# Patient Record
Sex: FEMALE | Race: WHITE | Employment: OTHER | URBAN - METROPOLITAN AREA
[De-identification: names, ages, dates, MRNs, and addresses within clinical notes are randomized per-mention and may not be internally consistent; named-entity substitution may affect disease eponyms.]

---

## 2017-07-05 ENCOUNTER — HOSPITAL ENCOUNTER (OUTPATIENT)
Dept: LAB | Age: 67
Discharge: HOME | End: 2017-07-05
Attending: INTERNAL MEDICINE
Payer: MEDICARE

## 2017-07-05 DIAGNOSIS — E78.2 MIXED HYPERLIPIDEMIA: ICD-10-CM

## 2017-07-05 LAB
ALBUMIN SERPL BCP-MCNC: 3.8 G/DL (ref 3.2–4.6)
ALBUMIN/GLOB SERPL: 0.9 {RATIO}
ALP SERPL-CCNC: 89 U/L (ref 50–136)
ALT SERPL-CCNC: 26 U/L (ref 12–65)
ANION GAP BLD CALC-SCNC: 9 MMOL/L
AST SERPL W P-5'-P-CCNC: 35 U/L (ref 15–37)
BILIRUB SERPL-MCNC: 0.6 MG/DL (ref 0.2–1.1)
BUN SERPL-MCNC: 10 MG/DL (ref 8–23)
CALCIUM SERPL-MCNC: 9.1 MG/DL (ref 8.3–10.4)
CHLORIDE SERPL-SCNC: 105 MMOL/L (ref 98–107)
CHOLEST SERPL-MCNC: 156 MG/DL
CO2 SERPL-SCNC: 25 MMOL/L (ref 21–32)
CREAT SERPL-MCNC: 0.7 MG/DL (ref 0.6–1)
GLOBULIN SER CALC-MCNC: 4.1 G/DL
GLUCOSE SERPL-MCNC: 103 MG/DL (ref 65–100)
HDLC SERPL-MCNC: 63 MG/DL (ref 40–60)
HDLC SERPL: 2.5 {RATIO}
LDLC SERPL CALC-MCNC: 56.6 MG/DL
LIPID PROFILE,FLP: ABNORMAL
POTASSIUM SERPL-SCNC: 4.3 MMOL/L (ref 3.5–5.1)
PROT SERPL-MCNC: 7.9 G/DL (ref 6.3–8.2)
SODIUM SERPL-SCNC: 139 MMOL/L (ref 136–145)
TRIGL SERPL-MCNC: 182 MG/DL (ref 35–150)
VLDLC SERPL CALC-MCNC: 36.4 MG/DL (ref 6–23)

## 2017-07-05 PROCEDURE — 80061 LIPID PANEL: CPT | Performed by: INTERNAL MEDICINE

## 2017-07-05 PROCEDURE — 36415 COLL VENOUS BLD VENIPUNCTURE: CPT | Performed by: INTERNAL MEDICINE

## 2017-07-05 PROCEDURE — 80053 COMPREHEN METABOLIC PANEL: CPT | Performed by: INTERNAL MEDICINE

## 2022-01-08 ENCOUNTER — APPOINTMENT (OUTPATIENT)
Dept: GENERAL RADIOLOGY | Age: 72
End: 2022-01-08
Attending: EMERGENCY MEDICINE
Payer: MEDICARE

## 2022-01-08 ENCOUNTER — HOSPITAL ENCOUNTER (EMERGENCY)
Age: 72
Discharge: HOME | End: 2022-01-08
Attending: EMERGENCY MEDICINE
Payer: MEDICARE

## 2022-01-08 VITALS
BODY MASS INDEX: 31.05 KG/M2 | HEIGHT: 60 IN | DIASTOLIC BLOOD PRESSURE: 77 MMHG | OXYGEN SATURATION: 97 % | HEART RATE: 83 BPM | TEMPERATURE: 98 F | SYSTOLIC BLOOD PRESSURE: 156 MMHG | RESPIRATION RATE: 16 BRPM

## 2022-01-08 DIAGNOSIS — M51.36 LUMBAR DEGENERATIVE DISC DISEASE: ICD-10-CM

## 2022-01-08 DIAGNOSIS — M54.9 ACUTE RIGHT-SIDED BACK PAIN, UNSPECIFIED BACK LOCATION: Primary | ICD-10-CM

## 2022-01-08 LAB
BACTERIA URNS QL MICRO: 0 /HPF
CASTS URNS QL MICRO: NORMAL /LPF
EPI CELLS #/AREA URNS HPF: NORMAL /HPF
RBC #/AREA URNS HPF: NORMAL /HPF
WBC URNS QL MICRO: NORMAL /HPF

## 2022-01-08 PROCEDURE — 99283 EMERGENCY DEPT VISIT LOW MDM: CPT

## 2022-01-08 PROCEDURE — 81003 URINALYSIS AUTO W/O SCOPE: CPT

## 2022-01-08 PROCEDURE — 72100 X-RAY EXAM L-S SPINE 2/3 VWS: CPT

## 2022-01-08 PROCEDURE — 81015 MICROSCOPIC EXAM OF URINE: CPT

## 2022-01-08 RX ORDER — NAPROXEN SODIUM 550 MG/1
550 TABLET ORAL 2 TIMES DAILY WITH MEALS
Qty: 20 TABLET | Refills: 0 | Status: SHIPPED | OUTPATIENT
Start: 2022-01-08

## 2022-01-08 RX ORDER — METHOCARBAMOL 500 MG/1
500 TABLET, FILM COATED ORAL
Qty: 20 TABLET | Refills: 0 | Status: SHIPPED | OUTPATIENT
Start: 2022-01-08

## 2022-01-08 NOTE — ED PROVIDER NOTES
Patient presents to the ER with family with concerns about back pain. States symptoms started yesterday. States pain in her right lower back. States pain is made worse with movement and certain positions. States symptoms did intensify today. Did improve with taking Aleve. Denies any nausea vomiting. Denies any trauma. Denies any pain radiating down the legs. Denies incontinence of bowel bladder    The history is provided by the patient. Back Pain   This is a new problem. The current episode started yesterday. The problem has not changed since onset. The pain is associated with lifting and twisting. The pain is present in the right side and lower back. The quality of the pain is described as aching. The pain is at a severity of 4/10. The pain is moderate. Pertinent negatives include no chest pain, no fever, no numbness, no abdominal swelling, no bowel incontinence, no bladder incontinence, no paresthesias and no paresis. She has tried NSAIDs for the symptoms. The treatment provided mild relief. Past Medical History:   Diagnosis Date    Acute MI ([address] 4/2013    Antiplatelet or antithrombotic long-term use 6/4/2013    Effient for BMS 4/24/13 discontinued prior to admission for CABG    Benign hypertension 6/7/2016    CAD (coronary artery disease)     Cerebral infarction ([address] 6/14/2013    TIA: 1. Right facial droop (6/13/13): Resolved. Occurred 9 days post CABG     Chronic kidney disease     kidney stones    Chronic pain     diabetic neuropathy    Chronic systolic heart failure ([address] 6/7/2016    1. Echo (5/8/13): Moderately reduced LV function. EF 45%. Mild LVH    Coronary atherosclerosis of native coronary artery 4/24/2013 6/4/13 (Dr. Roderick Glaser) Coronary artery bypass grafting x4 grafts consisting  of  1. Left internal mammary artery to left anterior descending.   2. Reverse saphenous vein graft to diagonal.  3. Reverse saphenous vein graft to obtuse marginal.  4. Reverse saphenous vein graft to the posterior descending which came  off of the right coronary artery. 1. Inferior STEMI 4/24/13  A. LHC: 3 vessel CAD. EF 45% with inferior hypokinesis. B. PCI of LCx with 2.5 x 12 MiniVision.  2. CABG (6/4/13): LIMA to LAD, SVG to diagonal, SVG to PDA and SVG to OM     [facility]     DM (diabetes mellitus) type I uncontrolled, periph vascular disorder ([facility] 6/4/2013    A1c 7.0 \"Diet Controlled\" on no meds PTA     History of kidney stones     Hypoxemia 6/4/2013    Mild left ventricular systolic dysfunction 2/26/4224    Without clinical congestive heart failure      Mixed dyslipidemia 6/4/2013    Morbid obesity ([facility]     Obesity (BMI 30.0-34.9)     33.3    ST elevation myocardial infarction (STEMI) of inferior wall (HCC) 4/24/2013    Thrombocytopenia due to blood loss 6/4/2013    Transient ischemic attack, TIA, Unspecified  6/27/2016    Type II diabetes mellitus with complication adult onset, uncontrolled ([address] 6/27/2016       Past Surgical History:   Procedure Laterality Date    HX CORONARY ARTERY BYPASS GRAFT  6/4/13    x 4 - Dr. Jasson Juarez HX PTCA  4/24/13    HX UROLOGICAL  x 2    percutaneous nephrolithotomy    SC CARDIAC SURG PROCEDURE UNLIST           Family History:   Problem Relation Age of Onset    Heart Disease Father        Social History     Socioeconomic History    Marital status:      Spouse name: Not on file    Number of children: Not on file    Years of education: Not on file    Highest education level: Not on file   Occupational History     Employer: RETIRED     Comment: [employer]   Tobacco Use    Smoking status: Never Smoker    Smokeless tobacco: Never Used   Substance and Sexual Activity    Alcohol use: Yes     Comment: social    Drug use: No    Sexual activity: Not on file   Other Topics Concern    Not on file   Social History Narrative    Not on file     Social Determinants of Health     Financial Resource Strain:     Difficulty of Paying Living Expenses: Not on file   Food Insecurity:     Worried About Running Out of Food in the Last Year: Not on file    Gillian of Food in the Last Year: Not on file   Transportation Needs:     Lack of Transportation (Medical): Not on file    Lack of Transportation (Non-Medical): Not on file   Physical Activity:     Days of Exercise per Week: Not on file    Minutes of Exercise per Session: Not on file   Stress:     Feeling of Stress : Not on file   Social Connections:     Frequency of Communication with Friends and Family: Not on file    Frequency of Social Gatherings with Friends and Family: Not on file    Attends Congregation Services: Not on file    Active Member of [address] [place] [address] [employer] or Organizations: Not on file    Attends Club or Organization Meetings: Not on file    Marital Status: Not on file   Intimate Partner Violence:     Fear of Current or Ex-Partner: Not on file    Emotionally Abused: Not on file    Physically Abused: Not on file    Sexually Abused: Not on file   Housing Stability:     Unable to Pay for Housing in the Last Year: Not on file    Number of [place] in the Last Year: Not on file    Unstable Housing in the Last Year: Not on file         ALLERGIES: Levaquin in d5w [levofloxacin in d5w] and Other medication    Review of Systems   Constitutional: Negative for fever. HENT: Negative for congestion, dental problem and trouble swallowing. Eyes: Negative for photophobia and redness. Respiratory: Negative for chest tightness, shortness of breath and stridor. Cardiovascular: Negative for chest pain. Gastrointestinal: Negative for bowel incontinence, nausea and vomiting. Endocrine: Negative for polyuria. Genitourinary: Negative for bladder incontinence, hematuria, vaginal bleeding and vaginal pain. Musculoskeletal: Positive for back pain. Negative for myalgias. Skin: Negative for color change. Neurological: Negative for seizures, numbness and paresthesias.    Hematological: Negative for adenopathy. Does not bruise/bleed easily. Psychiatric/Behavioral: Negative for behavioral problems and confusion. All other systems reviewed and are negative. Vitals:    01/08/22 1542   BP: (!) 156/77   Pulse: 83   Resp: 16   Temp: 98 °F (36.7 °C)   SpO2: 97%   Height: 5' (1.524 m)            Physical Exam  Vitals and nursing note reviewed. Constitutional:       General: She is not in acute distress. Appearance: Normal appearance. She is not ill-appearing. HENT:      Head: Normocephalic and atraumatic. Right Ear: External ear normal.      Left Ear: External ear normal.      Nose: Nose normal. No congestion or rhinorrhea. Eyes:      General:         Right eye: No discharge. Left eye: No discharge. Extraocular Movements: Extraocular movements intact. Pupils: Pupils are equal, round, and reactive to light. Cardiovascular:      Rate and Rhythm: Normal rate and regular rhythm. Pulses: Normal pulses. Heart sounds: Normal heart sounds. No murmur heard. No friction rub. Pulmonary:      Effort: Pulmonary effort is normal. No respiratory distress. Breath sounds: Normal breath sounds. No stridor. No wheezing or rhonchi. Abdominal:      General: Abdomen is flat. There is no distension. Palpations: Abdomen is soft. There is no mass. Tenderness: There is no abdominal tenderness. Hernia: No hernia is present. Musculoskeletal:         General: No swelling, tenderness, deformity or signs of injury. Normal range of motion. Cervical back: Normal range of motion and neck supple. No rigidity or tenderness. Skin:     General: Skin is warm. Capillary Refill: Capillary refill takes less than 2 seconds. Coloration: Skin is not jaundiced or pale. Findings: No bruising. Neurological:      General: No focal deficit present. Mental Status: She is alert and oriented to person, place, and time.    Psychiatric:         Mood and Affect: Mood normal.         Behavior: Behavior normal.          MDM  Number of Diagnoses or Management Options  Diagnosis management comments: Fairly well-appearing here without any reported chest pain, no abdominal tenderness on exam, no reported nausea    Will obtain urinalysis and x-ray lumbar spine    5:14 PM  X-ray of lumbar spine does show some degenerative changes. Urinalysis here is negative for blood or infection      Remains well-appearing here. Plan to discharge home. Will place on NSAIDs as well as muscle relaxers. Encourage close follow-up with PCP       Amount and/or Complexity of Data Reviewed  Clinical lab tests: ordered and reviewed  Tests in the radiology section of CPT®: ordered and reviewed    Risk of Complications, Morbidity, and/or Mortality  Presenting problems: moderate  Diagnostic procedures: low  Management options: moderate    Patient Progress  Patient progress: stable         Procedures                   Results Include:    Recent Results (from the past 24 hour(s))   URINE MICROSCOPIC    Collection Time: 01/08/22  4:37 PM   Result Value Ref Range    WBC 0-3 0 /hpf    RBC 0-3 0 /hpf    Epithelial cells 0-3 0 /hpf    Bacteria 0 0 /hpf    Casts 0-3 0 /lpf     Voice dictation software was used during the making of this note. This software is not perfect and grammatical and other typographical errors may be present. This note has been proofread, but may still contain errors. Dre Tillman MD; 1/8/2022 @5:15 PM   ===================================================================    I wore appropriate PPE throughout this patient's ED visit.  Dre Tillman MD, 5:15 PM

## 2022-01-08 NOTE — ED TRIAGE NOTES
Pt reports mid/right sided back pain that started yesterday. Pt denies injury or trauma.     Jose Eller RN

## 2022-01-08 NOTE — DISCHARGE INSTRUCTIONS
Take medications as prescribed  Expect to have some stiffness and soreness for the next couple days  Follow-up with your primary care physician  Return to the ER for any new, worsening or life-threatening symptoms

## 2022-01-08 NOTE — ED NOTES
I have reviewed discharge instructions with the patient. The patient verbalized understanding. Patient left ED via Discharge Method: ambulatory to Home with . Opportunity for questions and clarification provided. Patient given 2 scripts. To continue your aftercare when you leave the hospital, you may receive an automated call from our care team to check in on how you are doing. This is a free service and part of our promise to provide the best care and service to meet your aftercare needs.  If you have questions, or wish to unsubscribe from this service please call [phone]. Thank you for Choosing our [facility] Emergency Department.

## 2022-08-02 ENCOUNTER — HOSPITAL ENCOUNTER (EMERGENCY)
Dept: GENERAL RADIOLOGY | Age: 72
Discharge: HOME | End: 2022-08-05
Payer: MEDICARE

## 2022-08-02 ENCOUNTER — HOSPITAL ENCOUNTER (EMERGENCY)
Age: 72
Discharge: HOME | End: 2022-08-02
Attending: EMERGENCY MEDICINE
Payer: MEDICARE

## 2022-08-02 VITALS
HEART RATE: 87 BPM | HEIGHT: 60 IN | OXYGEN SATURATION: 95 % | SYSTOLIC BLOOD PRESSURE: 147 MMHG | WEIGHT: 150 LBS | BODY MASS INDEX: 29.45 KG/M2 | RESPIRATION RATE: 18 BRPM | DIASTOLIC BLOOD PRESSURE: 67 MMHG | TEMPERATURE: 98.8 F

## 2022-08-02 DIAGNOSIS — L03.115 CELLULITIS OF RIGHT FOOT: ICD-10-CM

## 2022-08-02 DIAGNOSIS — M79.671 RIGHT FOOT PAIN: Primary | ICD-10-CM

## 2022-08-02 PROCEDURE — 99283 EMERGENCY DEPT VISIT LOW MDM: CPT

## 2022-08-02 PROCEDURE — 6370000000 HC RX 637 (ALT 250 FOR IP): Performed by: NURSE PRACTITIONER

## 2022-08-02 PROCEDURE — 73630 X-RAY EXAM OF FOOT: CPT

## 2022-08-02 RX ORDER — CEPHALEXIN 500 MG/1
500 CAPSULE ORAL 4 TIMES DAILY
Qty: 28 CAPSULE | Refills: 0 | Status: SHIPPED | OUTPATIENT
Start: 2022-08-02 | End: 2022-08-09

## 2022-08-02 RX ORDER — CEPHALEXIN 500 MG/1
500 CAPSULE ORAL
Status: COMPLETED | OUTPATIENT
Start: 2022-08-02 | End: 2022-08-02

## 2022-08-02 RX ORDER — CEPHALEXIN 500 MG/1
500 CAPSULE ORAL 4 TIMES DAILY
Qty: 28 CAPSULE | Refills: 0 | Status: SHIPPED | OUTPATIENT
Start: 2022-08-02 | End: 2022-08-02

## 2022-08-02 RX ORDER — DONEPEZIL HYDROCHLORIDE 10 MG/1
10 TABLET, FILM COATED ORAL DAILY
COMMUNITY
Start: 2022-06-22 | End: 2023-06-22

## 2022-08-02 RX ADMIN — CEPHALEXIN 500 MG: 500 CAPSULE ORAL at 15:48

## 2022-08-02 ASSESSMENT — PAIN DESCRIPTION - ORIENTATION: ORIENTATION: RIGHT

## 2022-08-02 ASSESSMENT — ENCOUNTER SYMPTOMS
SHORTNESS OF BREATH: 0
ABDOMINAL PAIN: 0
COLOR CHANGE: 1

## 2022-08-02 ASSESSMENT — PAIN SCALES - GENERAL: PAINLEVEL_OUTOF10: 9

## 2022-08-02 ASSESSMENT — PAIN - FUNCTIONAL ASSESSMENT: PAIN_FUNCTIONAL_ASSESSMENT: 0-10

## 2022-08-02 ASSESSMENT — PAIN DESCRIPTION - LOCATION: LOCATION: FOOT

## 2022-08-02 NOTE — ED NOTES
I have reviewed discharge instructions with the patient. The patient verbalized understanding. Patient left ED via Discharge Method: wheelchair to Home with family    Opportunity for questions and clarification provided. Patient given 1 scripts. To continue your aftercare when you leave the hospital, you may receive an automated call from our care team to check in on how you are doing. This is a free service and part of our promise to provide the best care and service to meet your aftercare needs.  If you have questions, or wish to unsubscribe from this service please call [phone]. Thank you for Choosing our [facility] Emergency Department.         Wilder Marin RN  08/02/22 3680

## 2022-08-02 NOTE — ED PROVIDER NOTES
radiology section of CPT®: ordered and reviewed  Review and summarize past medical records: yes  Independent visualization of images, tracings, or specimens: yes    Risk of Complications, Morbidity, and/or Mortality  Presenting problems: low  Diagnostic procedures: low  Management options: low    Patient Progress  Patient progress: improved       Orders Placed This Encounter   Procedures    XR FOOT RIGHT (MIN 3 VIEWS)        Damaris Hoff is a 66 y.o. female who presents to the Emergency Department with chief complaint of    Chief Complaint   Patient presents with    Foot Pain      53-year-old female who presents emergency department today with complaint of right foot pain. She states that the pain began about 4 days ago and has progressively worsened. She reports swelling in her foot. She denies any falls, trauma, known injury, fever, chest pain, or shortness of breath. She took Aleve for the pain yesterday with some relief. She denies any treatment for her pain today. Pain is worse with palpation of the area and with bearing weight. The history is provided by the patient. Foot Problem  Pain details:     Quality:  Aching and throbbing    Radiates to:  Does not radiate    Severity:  Moderate    Onset quality:  Gradual    Duration:  4 days    Timing:  Constant    Progression:  Worsening  Chronicity:  New  Relieved by:  Rest and NSAIDs  Worsened by:  Bearing weight  Ineffective treatments:  None tried  Associated symptoms: swelling    Associated symptoms: no decreased ROM, no fever and no numbness        Review of Systems   Constitutional:  Negative for fever. Respiratory:  Negative for shortness of breath. Cardiovascular:  Negative for chest pain. Gastrointestinal:  Negative for abdominal pain. Musculoskeletal:  Positive for arthralgias. Skin:  Positive for color change. All other systems reviewed and are negative.     Past Medical History:   Diagnosis Date    Acute MI ([facility] 4/2013    Antiplatelet or antithrombotic long-term use 6/4/2013    Effient for BMS 4/24/13 discontinued prior to admission for CABG    Benign hypertension 6/7/2016    CAD (coronary artery disease)     Cerebral infarction ([address] 6/14/2013    TIA: 1. Right facial droop (6/13/13): Resolved. Occurred 9 days post CABG     Chronic kidney disease     kidney stones    Chronic pain     diabetic neuropathy    Chronic systolic heart failure ([address] 6/7/2016    1. Echo (5/8/13): Moderately reduced LV function. EF 45%. Mild LVH    Coronary atherosclerosis of native coronary artery 4/24/2013 6/4/13 (Dr. Kim Steele) Coronary artery bypass grafting x4 grafts consisting  of  1. Left internal mammary artery to left anterior descending. 2. Reverse saphenous vein graft to diagonal.  3. Reverse saphenous vein graft to obtuse marginal.  4. Reverse saphenous vein graft to the posterior descending which came  off of the right coronary artery. 1. Inferior STEMI 4/24/13  A. LHC: 3 vessel CAD.  EF     Diabetes ([address]     DM (diabetes mellitus) type I uncontrolled, periph vascular disorder ([address] 6/4/2013    A1c 7.0 \"Diet Controlled\" on no meds PTA     History of kidney stones     Hypoxemia 6/4/2013    Mild left ventricular systolic dysfunction 8/25/6773    Without clinical congestive heart failure      Mixed dyslipidemia 6/4/2013    Morbid obesity ([address]     Obesity (BMI 30.0-34.9)     33.3    ST elevation myocardial infarction (STEMI) of inferior wall ([address] 4/24/2013    Thrombocytopenia due to blood loss 6/4/2013    Transient ischemic attack (TIA) 6/27/2016    Type II diabetes mellitus with complication, uncontrolled ([address] 6/27/2016        Past Surgical History:   Procedure Laterality Date    CORONARY ARTERY BYPASS GRAFT  6/4/13    x 4 - Dr. Aron Dumont      PTCA  4/24/13    UROLOGICAL SURGERY  x 2    percutaneous nephrolithotomy        Family History   Problem Relation Age of Onset    Heart Disease Father         Social History Socioeconomic History    Marital status:    Tobacco Use    Smoking status: Never    Smokeless tobacco: Never   Substance and Sexual Activity    Alcohol use: Yes    Drug use: No         Levofloxacin in d5w     Previous Medications    ASPIRIN 81 MG EC TABLET    1 tablet dailyOver the counter medicine    CARVEDILOL (COREG) 3.125 MG TABLET    Take 3.125 mg by mouth 2 times daily (with meals)    DONEPEZIL (ARICEPT) 10 MG TABLET    Take 10 mg by mouth in the morning. LOSARTAN (COZAAR) 25 MG TABLET    Take 25 mg by mouth    METHOCARBAMOL (ROBAXIN) 500 MG TABLET    Take 500 mg by mouth 3 times daily as needed    NAPROXEN SODIUM (ANAPROX) 550 MG TABLET    Take 550 mg by mouth 2 times daily (with meals)    SIMVASTATIN (ZOCOR) 40 MG TABLET    Take 40 mg by mouth        Vitals signs and nursing note reviewed. Patient Vitals for the past 4 hrs:   Temp Pulse Resp BP SpO2   08/02/22 1419 98.8 °F (37.1 °C) 87 18 (!) 147/67 95 %          Physical Exam  Vitals and nursing note reviewed. Constitutional:       General: She is not in acute distress. Appearance: Normal appearance. She is well-developed. She is not ill-appearing, toxic-appearing or diaphoretic. HENT:      Head: Normocephalic and atraumatic. Mouth/Throat:      Mouth: Mucous membranes are moist.   Eyes:      General: No scleral icterus. Extraocular Movements: Extraocular movements intact. Cardiovascular:      Rate and Rhythm: Normal rate. Pulses:           Dorsalis pedis pulses are 2+ on the right side and 2+ on the left side. Posterior tibial pulses are 2+ on the right side and 2+ on the left side. Pulmonary:      Effort: Pulmonary effort is normal. No respiratory distress. Abdominal:      General: Abdomen is flat. There is no distension. Musculoskeletal:      Right lower leg: No edema. Left lower leg: No edema. Left ankle: Normal.      Left foot: Normal range of motion and normal capillary refill.  Swelling and tenderness present. No deformity. Normal pulse. Comments: Swelling, tenderness, and erythema to the dorsal aspect of the right foot. Skin:     General: Skin is warm and dry. Capillary Refill: Capillary refill takes less than 2 seconds. Neurological:      General: No focal deficit present. Mental Status: She is alert and oriented to person, place, and time. Psychiatric:         Mood and Affect: Mood normal.         Behavior: Behavior normal.        Procedures      Labs Reviewed - No data to display     XR FOOT RIGHT (MIN 3 VIEWS)   Final Result      1. No acute osseous abnormality. 2.  Dorsal prominent soft tissue swelling of the forefoot, nonspecific. 3.  Subtle mineralization along the medial margin of the first MTP joint,   nonspecific. Differential considerations include gout. Clinical correlation   recommended. ED Course as of 08/02/22 1529   Tue Aug 02, 2022   1513 XR FOOT RIGHT (MIN 3 VIEWS)  IMPRESSION:     1. No acute osseous abnormality. 2.  Dorsal prominent soft tissue swelling of the forefoot, nonspecific. 3.  Subtle mineralization along the medial margin of the first MTP joint,  nonspecific. Differential considerations include gout. Clinical correlation  recommended. [BC]      ED Course User Index  [BC] [facility], CECY - CNP        Voice dictation software was used during the making of this note. This software is not perfect and grammatical and other typographical errors may be present. This note has not been completely proofread for errors.        [facility], APRN - [id number]   08/02/22 1520

## 2022-08-02 NOTE — DISCHARGE INSTRUCTIONS
As we discussed, I am concerned that this is cellulitis today. We will treat you with antibiotics. Take medication as prescribed. Please follow-up with your primary care provider for recheck of your symptoms. Take Tylenol or ibuprofen if needed for pain. Return to the emergency department for any new, worsening, or concerning symptoms.